# Patient Record
Sex: MALE | Race: OTHER
[De-identification: names, ages, dates, MRNs, and addresses within clinical notes are randomized per-mention and may not be internally consistent; named-entity substitution may affect disease eponyms.]

---

## 2021-04-01 ENCOUNTER — TRANSCRIPTION ENCOUNTER (OUTPATIENT)
Age: 36
End: 2021-04-01

## 2022-04-28 PROBLEM — Z00.00 ENCOUNTER FOR PREVENTIVE HEALTH EXAMINATION: Status: ACTIVE | Noted: 2022-04-28

## 2022-05-02 ENCOUNTER — APPOINTMENT (OUTPATIENT)
Dept: ORTHOPEDIC SURGERY | Facility: CLINIC | Age: 37
End: 2022-05-02
Payer: OTHER MISCELLANEOUS

## 2022-05-02 VITALS — HEIGHT: 66 IN | BODY MASS INDEX: 30.53 KG/M2 | WEIGHT: 190 LBS

## 2022-05-02 DIAGNOSIS — Z78.9 OTHER SPECIFIED HEALTH STATUS: ICD-10-CM

## 2022-05-02 PROCEDURE — 99072 ADDL SUPL MATRL&STAF TM PHE: CPT

## 2022-05-02 PROCEDURE — 99213 OFFICE O/P EST LOW 20 MIN: CPT

## 2022-05-02 NOTE — HISTORY OF PRESENT ILLNESS
[Work related] : work related [5] : 5 [0] : 0 [Stabbing] : stabbing [Throbbing] : throbbing [Intermittent] : intermittent [Meds] : meds [Not working due to injury] : Work status: not working due to injury [de-identified] : follow  up evaluation for a work related injury reported 11/12/21 [] : no [FreeTextEntry1] : right hand [FreeTextEntry3] : 11/12/21 [FreeTextEntry6] : t [de-identified] : usage

## 2022-06-06 ENCOUNTER — APPOINTMENT (OUTPATIENT)
Dept: ORTHOPEDIC SURGERY | Facility: CLINIC | Age: 37
End: 2022-06-06

## 2022-07-18 ENCOUNTER — APPOINTMENT (OUTPATIENT)
Dept: ORTHOPEDIC SURGERY | Facility: CLINIC | Age: 37
End: 2022-07-18

## 2022-07-18 VITALS — HEIGHT: 66 IN | BODY MASS INDEX: 30.53 KG/M2 | WEIGHT: 190 LBS

## 2022-07-18 DIAGNOSIS — S67.10XD CRUSHING INJURY OF UNSPECIFIED FINGER(S), SUBSEQUENT ENCOUNTER: ICD-10-CM

## 2022-07-18 DIAGNOSIS — S62.632D DISPLACED FRACTURE OF DISTAL PHALANX OF RIGHT MIDDLE FINGER, SUBSEQUENT ENCOUNTER FOR FRACTURE WITH ROUTINE HEALING: ICD-10-CM

## 2022-07-18 DIAGNOSIS — S62.634D DISPLACED FRACTURE OF DISTAL PHALANX OF RIGHT RING FINGER, SUBSEQUENT ENCOUNTER FOR FRACTURE WITH ROUTINE HEALING: ICD-10-CM

## 2022-07-18 PROCEDURE — 99072 ADDL SUPL MATRL&STAF TM PHE: CPT

## 2022-07-18 PROCEDURE — 99213 OFFICE O/P EST LOW 20 MIN: CPT

## 2022-07-18 NOTE — HISTORY OF PRESENT ILLNESS
[0] : 0 [Dull/Aching] : dull/aching [Throbbing] : throbbing [Rest] : rest [Meds] : meds [Work related] : work related [5] : 5 [Stabbing] : stabbing [Intermittent] : intermittent [Not working due to injury] : Work status: not working due to injury [de-identified] : follow  up evaluation for a work related injury reported 11/12/21 [] : no [FreeTextEntry1] : right hand [FreeTextEntry3] : 11/12/21 [FreeTextEntry5] : 7/18 pain improved [de-identified] : usage

## 2022-08-15 ENCOUNTER — APPOINTMENT (OUTPATIENT)
Dept: ORTHOPEDIC SURGERY | Facility: CLINIC | Age: 37
End: 2022-08-15